# Patient Record
Sex: MALE | Race: WHITE | NOT HISPANIC OR LATINO | URBAN - METROPOLITAN AREA
[De-identification: names, ages, dates, MRNs, and addresses within clinical notes are randomized per-mention and may not be internally consistent; named-entity substitution may affect disease eponyms.]

---

## 2019-11-15 ENCOUNTER — EMERGENCY (EMERGENCY)
Facility: HOSPITAL | Age: 29
LOS: 1 days | Discharge: HOME | End: 2019-11-15
Attending: STUDENT IN AN ORGANIZED HEALTH CARE EDUCATION/TRAINING PROGRAM
Payer: COMMERCIAL

## 2019-11-15 VITALS
SYSTOLIC BLOOD PRESSURE: 151 MMHG | DIASTOLIC BLOOD PRESSURE: 86 MMHG | RESPIRATION RATE: 18 BRPM | OXYGEN SATURATION: 98 % | HEART RATE: 93 BPM

## 2019-11-15 VITALS
DIASTOLIC BLOOD PRESSURE: 110 MMHG | HEIGHT: 66 IN | HEART RATE: 130 BPM | TEMPERATURE: 98 F | OXYGEN SATURATION: 98 % | SYSTOLIC BLOOD PRESSURE: 164 MMHG | RESPIRATION RATE: 20 BRPM | WEIGHT: 171.96 LBS

## 2019-11-15 LAB
ALBUMIN SERPL ELPH-MCNC: 4.8 G/DL — SIGNIFICANT CHANGE UP (ref 3.3–5.2)
ALP SERPL-CCNC: 49 U/L — SIGNIFICANT CHANGE UP (ref 40–120)
ALT FLD-CCNC: 34 U/L — SIGNIFICANT CHANGE UP
ANION GAP SERPL CALC-SCNC: 13 MMOL/L — SIGNIFICANT CHANGE UP (ref 5–17)
APTT BLD: 28.5 SEC — SIGNIFICANT CHANGE UP (ref 27.5–36.3)
AST SERPL-CCNC: 20 U/L — SIGNIFICANT CHANGE UP
BASOPHILS # BLD AUTO: 0.07 K/UL — SIGNIFICANT CHANGE UP (ref 0–0.2)
BASOPHILS NFR BLD AUTO: 0.6 % — SIGNIFICANT CHANGE UP (ref 0–2)
BILIRUB SERPL-MCNC: 0.7 MG/DL — SIGNIFICANT CHANGE UP (ref 0.4–2)
BUN SERPL-MCNC: 15 MG/DL — SIGNIFICANT CHANGE UP (ref 8–20)
CALCIUM SERPL-MCNC: 9.8 MG/DL — SIGNIFICANT CHANGE UP (ref 8.6–10.2)
CHLORIDE SERPL-SCNC: 98 MMOL/L — SIGNIFICANT CHANGE UP (ref 98–107)
CO2 SERPL-SCNC: 27 MMOL/L — SIGNIFICANT CHANGE UP (ref 22–29)
CREAT SERPL-MCNC: 0.99 MG/DL — SIGNIFICANT CHANGE UP (ref 0.5–1.3)
D DIMER BLD IA.RAPID-MCNC: <150 NG/ML DDU — SIGNIFICANT CHANGE UP
EOSINOPHIL # BLD AUTO: 0.21 K/UL — SIGNIFICANT CHANGE UP (ref 0–0.5)
EOSINOPHIL NFR BLD AUTO: 1.7 % — SIGNIFICANT CHANGE UP (ref 0–6)
GLUCOSE SERPL-MCNC: 108 MG/DL — SIGNIFICANT CHANGE UP (ref 70–115)
HCT VFR BLD CALC: 43.9 % — SIGNIFICANT CHANGE UP (ref 39–50)
HGB BLD-MCNC: 15.4 G/DL — SIGNIFICANT CHANGE UP (ref 13–17)
IMM GRANULOCYTES NFR BLD AUTO: 0.6 % — SIGNIFICANT CHANGE UP (ref 0–1.5)
INR BLD: 1.09 RATIO — SIGNIFICANT CHANGE UP (ref 0.88–1.16)
LYMPHOCYTES # BLD AUTO: 2.54 K/UL — SIGNIFICANT CHANGE UP (ref 1–3.3)
LYMPHOCYTES # BLD AUTO: 20.3 % — SIGNIFICANT CHANGE UP (ref 13–44)
MAGNESIUM SERPL-MCNC: 2.1 MG/DL — SIGNIFICANT CHANGE UP (ref 1.6–2.6)
MCHC RBC-ENTMCNC: 29.4 PG — SIGNIFICANT CHANGE UP (ref 27–34)
MCHC RBC-ENTMCNC: 35.1 GM/DL — SIGNIFICANT CHANGE UP (ref 32–36)
MCV RBC AUTO: 83.9 FL — SIGNIFICANT CHANGE UP (ref 80–100)
MONOCYTES # BLD AUTO: 0.67 K/UL — SIGNIFICANT CHANGE UP (ref 0–0.9)
MONOCYTES NFR BLD AUTO: 5.4 % — SIGNIFICANT CHANGE UP (ref 2–14)
NEUTROPHILS # BLD AUTO: 8.93 K/UL — HIGH (ref 1.8–7.4)
NEUTROPHILS NFR BLD AUTO: 71.4 % — SIGNIFICANT CHANGE UP (ref 43–77)
PLATELET # BLD AUTO: 275 K/UL — SIGNIFICANT CHANGE UP (ref 150–400)
POTASSIUM SERPL-MCNC: 4.4 MMOL/L — SIGNIFICANT CHANGE UP (ref 3.5–5.3)
POTASSIUM SERPL-SCNC: 4.4 MMOL/L — SIGNIFICANT CHANGE UP (ref 3.5–5.3)
PROT SERPL-MCNC: 7.7 G/DL — SIGNIFICANT CHANGE UP (ref 6.6–8.7)
PROTHROM AB SERPL-ACNC: 12.6 SEC — SIGNIFICANT CHANGE UP (ref 10–12.9)
RBC # BLD: 5.23 M/UL — SIGNIFICANT CHANGE UP (ref 4.2–5.8)
RBC # FLD: 11.4 % — SIGNIFICANT CHANGE UP (ref 10.3–14.5)
SODIUM SERPL-SCNC: 138 MMOL/L — SIGNIFICANT CHANGE UP (ref 135–145)
T4 AB SER-ACNC: 9.6 UG/DL — SIGNIFICANT CHANGE UP (ref 4.5–12)
TROPONIN T SERPL-MCNC: <0.01 NG/ML — SIGNIFICANT CHANGE UP (ref 0–0.06)
TSH SERPL-MCNC: 2.31 UIU/ML — SIGNIFICANT CHANGE UP (ref 0.27–4.2)
WBC # BLD: 12.5 K/UL — HIGH (ref 3.8–10.5)
WBC # FLD AUTO: 12.5 K/UL — HIGH (ref 3.8–10.5)

## 2019-11-15 PROCEDURE — 71046 X-RAY EXAM CHEST 2 VIEWS: CPT

## 2019-11-15 PROCEDURE — 84443 ASSAY THYROID STIM HORMONE: CPT

## 2019-11-15 PROCEDURE — 80053 COMPREHEN METABOLIC PANEL: CPT

## 2019-11-15 PROCEDURE — 84436 ASSAY OF TOTAL THYROXINE: CPT

## 2019-11-15 PROCEDURE — 93010 ELECTROCARDIOGRAM REPORT: CPT

## 2019-11-15 PROCEDURE — 85027 COMPLETE CBC AUTOMATED: CPT

## 2019-11-15 PROCEDURE — 83735 ASSAY OF MAGNESIUM: CPT

## 2019-11-15 PROCEDURE — 99285 EMERGENCY DEPT VISIT HI MDM: CPT

## 2019-11-15 PROCEDURE — 71046 X-RAY EXAM CHEST 2 VIEWS: CPT | Mod: 26

## 2019-11-15 PROCEDURE — 85730 THROMBOPLASTIN TIME PARTIAL: CPT

## 2019-11-15 PROCEDURE — 36415 COLL VENOUS BLD VENIPUNCTURE: CPT

## 2019-11-15 PROCEDURE — 93005 ELECTROCARDIOGRAM TRACING: CPT

## 2019-11-15 PROCEDURE — 85610 PROTHROMBIN TIME: CPT

## 2019-11-15 PROCEDURE — 99284 EMERGENCY DEPT VISIT MOD MDM: CPT

## 2019-11-15 PROCEDURE — 85379 FIBRIN DEGRADATION QUANT: CPT

## 2019-11-15 PROCEDURE — 84484 ASSAY OF TROPONIN QUANT: CPT

## 2019-11-15 RX ORDER — METOPROLOL TARTRATE 50 MG
5 TABLET ORAL ONCE
Refills: 0 | Status: DISCONTINUED | OUTPATIENT
Start: 2019-11-15 | End: 2019-11-15

## 2019-11-15 NOTE — ED ADULT TRIAGE NOTE - CHIEF COMPLAINT QUOTE
pt a+ox3, reports sudden onset and worsening of palpitations. pt reports change in BP meds today, denies chest pain or chest discomfort.

## 2019-11-15 NOTE — ED ADULT NURSE NOTE - OBJECTIVE STATEMENT
Pt. presents AxOx4 to ED complaining of racing heartbeat and HTN. Pt. reports having hx of HTN, had meds changed earlier this week from lisinopril 10mg PO to an unknown medication (pt. can't remember name). Pt. states he felt his heart racing at home, took BP and said "it was high." Pt. then took BP several more times, "it kept going higher and I think I panicked a little." Pt. denies N/V/D, diaphoresis, syncope. Pt. states he called EMS, according to pt upon EMS arrival BP 's/100's. Pt. at presentation this RN is awake, alert, in NAD. Pt. tachycardic in 110's-120's on cardiac monitor, rhythm regular, pt. hypertensive, asymptomatic. Pt. denies numbness, tingling, vision changes, chest pain, SOB. Skin warm, dry, normal for race. Breathing spontaneous, unlabored, symmetrical on room air. No noted diaphoresis or pallor. Pt. FRANKS and ambulating independently without difficulty.

## 2019-11-15 NOTE — ED PROVIDER NOTE - CLINICAL SUMMARY MEDICAL DECISION MAKING FREE TEXT BOX
30 y/o M with PMHx of HTN (dx this year) , presents to the ED c/o high BP and mild chest tightness around 6:45 pm today when he got home and was by himself. 30 y/o M with PMHx of HTN (dx this year) , presents to the ED c/o high BP and mild chest tightness around 5:45 pm today when he got home and was by himself, pt under a lot of stress lately, pt with no acute ischemic changes on ekg, labs wnl, low risk MACE heart score 1, symptoms resolved here, pt to follow up with pmd

## 2019-11-15 NOTE — ED PROVIDER NOTE - OBJECTIVE STATEMENT
30 y/o M with PMHx of HTN(dx this year) , presents to the ED c/o high BP and mild chest tightness around 6:45 pm today when he got home and was by himself. Pt noted BP of 175/103 at home. Pt notes he felt anxiety today after visiting his grandmother at the nursing. He states he has been stressed out recently for the past year due to his job. Pt states his bp medications were changed 1 week ago from lisinopril to an unknown medication. Denies hx and FHx of MI. No thyroid issues, DVT, PE.  NKDA. Nonsmoker. Occasional EtOH use. No substance abuse.  No sexually transmitted diseases. Pt denies fevers/chills, ha, loc, focal neuro deficits, cp/sob/palp, cough, abd pain/n/v/d, urinary symptoms, recent travel and sick contacts. 30 y/o M with PMHx of HTN(dx this year) , presents to the ED c/o high BP and mild chest tightness around 6:45 pm today when he got home and was by himself. Pt noted BP of 175/103 at home. Pt notes he felt anxiety today after visiting his grandmother at the nursing. He states he has been stressed out recently for the past year due to his job. Pt states his bp medications were changed 1 week ago from lisinopril to an unknown medication. Denies hx and FHx of MI. No thyroid issues, DVT, PE.  NKDA. Nonsmoker. Occasional EtOH use. No substance abuse. No SI. No HI. No sexually transmitted diseases. Pt denies fevers/chills, ha, loc, focal neuro deficits, cp/sob/palp, cough, abd pain/n/v/d, urinary symptoms, recent travel and sick contacts. 30 y/o M with PMHx of HTN(dx this year) , presents to the ED c/o high BP and mild chest tightness around 5:45 pm today when he got home and was by himself. Pt noted BP of 175/103 at home. Pt notes he felt anxiety today after visiting his grandmother at the nursing. He states he has been stressed out recently for the past year due to his job. Pt states his bp medications were changed 1 week ago from lisinopril to an unknown medication. Denies hx and FHx of MI. No thyroid issues, DVT, PE.  NKDA. Nonsmoker. Occasional EtOH use. No substance abuse. No SI. No HI. No sexually transmitted diseases. Pt denies fevers/chills, ha, loc, focal neuro deficits, cp/sob/palp, cough, abd pain/n/v/d, urinary symptoms, recent travel and sick contacts.

## 2019-11-15 NOTE — ED ADULT NURSE NOTE - CHPI ED NUR SYMPTOMS NEG
no nausea/no syncope/no dizziness/no back pain/no diaphoresis/no shortness of breath/no vomiting/no chest pain/no chills/no congestion

## 2019-11-15 NOTE — ED PROVIDER NOTE - PATIENT PORTAL LINK FT
You can access the FollowMyHealth Patient Portal offered by [facility] by registering at the following website: [email or web address]. By joining [employer]’s FollowMyHealth portal, you will also be able to view your health information using other applications (apps) compatible with our system.

## 2019-11-15 NOTE — ED ADULT NURSE NOTE - NSIMPLEMENTINTERV_GEN_ALL_ED
Implemented All Universal Safety Interventions:  [place] to call system. Call bell, personal items and telephone within reach. Instruct patient to call for assistance. Room bathroom lighting operational. Non-slip footwear when patient is off stretcher. Physically safe environment: no spills, clutter or unnecessary equipment. Stretcher in lowest position, wheels locked, appropriate side rails in place.

## 2019-11-15 NOTE — ED PROVIDER NOTE - CPE EDP RESP NORM
BULMARO received call from [facility] stating that they would not be able to take pt via ambulance because they were not in contract with her insurance company.  BULMARO called [facility] and gave them pickup information and they stated that they would pick pt up at 630.  BULMARO called [facility] and explained that transport was for 630.  Akanksha asked if Nurse could call when ambualnce is picking pt up, and [facility]'s cell number was given to Ms. [email or web address] ([id number]), who stated that she would call once [facility] arrived to let them know that pt was in transit.          Zohreh Maya LMSW   normal...